# Patient Record
Sex: FEMALE | Race: WHITE | NOT HISPANIC OR LATINO | Employment: FULL TIME | URBAN - METROPOLITAN AREA
[De-identification: names, ages, dates, MRNs, and addresses within clinical notes are randomized per-mention and may not be internally consistent; named-entity substitution may affect disease eponyms.]

---

## 2020-03-13 LAB — HBA1C MFR BLD HPLC: 5.8 %

## 2020-04-02 ENCOUNTER — TELEMEDICINE (OUTPATIENT)
Dept: ENDOCRINOLOGY | Facility: HOSPITAL | Age: 35
End: 2020-04-02
Payer: COMMERCIAL

## 2020-04-02 DIAGNOSIS — E03.9 ACQUIRED HYPOTHYROIDISM: Primary | ICD-10-CM

## 2020-04-02 DIAGNOSIS — E55.9 VITAMIN D DEFICIENCY: ICD-10-CM

## 2020-04-02 PROCEDURE — 99214 OFFICE O/P EST MOD 30 MIN: CPT | Performed by: INTERNAL MEDICINE

## 2020-04-02 RX ORDER — LEVOTHYROXINE SODIUM 0.12 MG/1
125 TABLET ORAL DAILY
Qty: 30 TABLET | Refills: 6 | Status: SHIPPED | OUTPATIENT
Start: 2020-04-02 | End: 2020-10-14 | Stop reason: SDUPTHER

## 2020-04-02 RX ORDER — ERGOCALCIFEROL 1.25 MG/1
50000 CAPSULE ORAL WEEKLY
COMMUNITY
Start: 2020-03-16 | End: 2020-07-09 | Stop reason: SDUPTHER

## 2020-04-02 RX ORDER — LEVOTHYROXINE SODIUM 0.12 MG/1
125 TABLET ORAL DAILY
COMMUNITY
Start: 2020-03-13 | End: 2020-04-02 | Stop reason: SDUPTHER

## 2020-04-02 RX ORDER — NORGESTIMATE AND ETHINYL ESTRADIOL 7DAYSX3 28
1 KIT ORAL DAILY
COMMUNITY
Start: 2020-03-13 | End: 2020-07-09 | Stop reason: ALTCHOICE

## 2020-04-02 RX ORDER — LIOTHYRONINE SODIUM 5 UG/1
5 TABLET ORAL DAILY
Qty: 30 TABLET | Refills: 6 | Status: SHIPPED | OUTPATIENT
Start: 2020-04-02 | End: 2020-10-14 | Stop reason: SDUPTHER

## 2020-06-17 LAB
T3FREE SERPL-MCNC: 3.6 PG/ML (ref 2.3–4.2)
T4 FREE SERPL-MCNC: 1.7 NG/DL (ref 0.8–1.8)
TSH SERPL-ACNC: 0.37 MIU/L

## 2020-07-09 ENCOUNTER — OFFICE VISIT (OUTPATIENT)
Dept: ENDOCRINOLOGY | Facility: HOSPITAL | Age: 35
End: 2020-07-09
Payer: COMMERCIAL

## 2020-07-09 VITALS
DIASTOLIC BLOOD PRESSURE: 98 MMHG | BODY MASS INDEX: 35.83 KG/M2 | HEART RATE: 118 BPM | TEMPERATURE: 98.2 F | SYSTOLIC BLOOD PRESSURE: 142 MMHG | WEIGHT: 236.4 LBS | HEIGHT: 68 IN

## 2020-07-09 DIAGNOSIS — E55.9 VITAMIN D DEFICIENCY: ICD-10-CM

## 2020-07-09 DIAGNOSIS — E03.9 ACQUIRED HYPOTHYROIDISM: Primary | ICD-10-CM

## 2020-07-09 DIAGNOSIS — R73.09 ELEVATED HEMOGLOBIN A1C: ICD-10-CM

## 2020-07-09 DIAGNOSIS — Z83.3 FAMILY HISTORY OF DIABETES MELLITUS: ICD-10-CM

## 2020-07-09 PROBLEM — E78.1 HYPERTRIGLYCERIDEMIA: Status: ACTIVE | Noted: 2020-07-09

## 2020-07-09 PROCEDURE — 99213 OFFICE O/P EST LOW 20 MIN: CPT | Performed by: INTERNAL MEDICINE

## 2020-07-09 RX ORDER — ERGOCALCIFEROL 1.25 MG/1
50000 CAPSULE ORAL WEEKLY
Qty: 12 CAPSULE | Refills: 3 | Status: SHIPPED | OUTPATIENT
Start: 2020-07-09 | End: 2021-07-06 | Stop reason: SDUPTHER

## 2020-07-09 RX ORDER — ALBUTEROL SULFATE 90 UG/1
2 AEROSOL, METERED RESPIRATORY (INHALATION) EVERY 6 HOURS PRN
COMMUNITY

## 2020-07-09 NOTE — PATIENT INSTRUCTIONS
The thyroid blood work is good  Continue the same levothyroxine and cytomel  If pregnant, call and we'll change medicine  Follow up in 3 months with fasting blood work and we'll change medicine to levothyroxine only as cytomel is not approved in pregnancy

## 2020-07-09 NOTE — PROGRESS NOTES
7/9/2020    Assessment/Plan      Diagnoses and all orders for this visit:    Acquired hypothyroidism  -     TSH, 3rd generation Lab Collect; Future  -     T4, free Lab Collect; Future  -     T3, free; Future    Vitamin D deficiency  -     TSH, 3rd generation Lab Collect; Future  -     T4, free Lab Collect; Future  -     T3, free; Future  -     ergocalciferol (VITAMIN D2) 50,000 units; Take 1 capsule (50,000 Units total) by mouth once a week    Elevated hemoglobin A1c  -     HEMOGLOBIN A1C W/ EAG ESTIMATION Lab Collect; Future  -     Comprehensive metabolic panel Lab Collect; Future  -     Insulin, fasting- Lab Collect; Future    Family history of diabetes mellitus  -     HEMOGLOBIN A1C W/ EAG ESTIMATION Lab Collect; Future  -     Comprehensive metabolic panel Lab Collect; Future  -     Insulin, fasting- Lab Collect; Future    Other orders  -     Prenatal MV-Min-Fe Fum-FA-DHA (PRENATAL 1 PO); Take by mouth  -     albuterol (PROVENTIL HFA,VENTOLIN HFA) 90 mcg/act inhaler; Inhale 2 puffs every 6 (six) hours as needed for wheezing        Assessment/Plan:  1  Hypothyroidism  Her most recent thyroid function tests do show a TSH that is slightly below normal but still reasonable and she is feeling quite well with a normal free T4 and free T3  She is clinically euthyroid and feels much better  For now, she will continue the same levothyroxine 125 mcg and liothyronine 5 mcg daily  She was informed that she cannot be on T3 supplementation if she decides to get pregnant so I will need to switch her to all levothyroxine likely at 150 mcg daily if she decides she is going to try to get pregnant  She says right now she is going to wait till January so I will have her follow up in the fall and switch her to levothyroxine alone at that point  She was asked to call if she gets pregnant before her next visit and we will switch her to levothyroxine alone  2  Vitamin-D deficiency    She will continue the same vitamin-D 62779 units once a week  3  Elevated hemoglobin A1c with family history of diabetes  She had a recently elevated hemoglobin A1c of 5 8%  This is in the prediabetes range  I will repeat a hemoglobin A1c and fasting CMP with insulin level with her next visit  She will continue to work on carbohydrate restriction weight loss along with exercise in the meantime  I have asked her to follow up in 3 months with preceding hemoglobin A1c, fasting CMP and insulin level, TSH, free T4, and free T3     CC:  Hypothyroid and vitamin-D follow-up    History of Present Illness     HPI: Consuelo Rainey is a 37y o  year old female with history of hypothyroidism, antibody negative and vitamin-D deficiency for follow-up visit  She was diagnosed with hypothyroidism in 2006 to 2007 and started thyroid hormone  Last visit given her symptoms, restarted T3 supplementation and I have placed her on levothyroxine 125 mcg daily and Cytomel 5 mcg daily  She is feeling much better  Had transient lockjaw with first started T3  She says she feels much better with T3 supplementation  She still feels cold when she is in the are conditioning and sweating outside  She denies significant fatigue  She says her brain fog is almost gone  She has chronic dry skin still and brittle nails along with hair loss  She denies constipation or diarrhea, palpitation, tremors, anxiety or depression  She denies insomnia  Menstrual cycles have been occurring once a month but they are about a week late  She stopped her oral contraceptives about 3 months ago  She denies diplopia  She denies compressive thyroid symptoms or difficulties with swallowing  She has vitamin-D deficiency  She takes vitamin-D 89057 units once a week per her primary care physician  She denies perioral paresthesias  She reports that she recently had a hemoglobin A1c of 5 8%  She says she has a family history of type 2 diabetes    She has interesting and following her blood sugars  Struggling with son and his antics and he is almost 1years old  She is trying to get pregnant soon  Maybe in jan 2021  Review of Systems   Constitutional: Negative for fatigue and unexpected weight change  Fatigue improving  Was 260 lbs jan 2020 and weight down in the 230-238 lbs now  Had intermittent fasting unintentionally  HENT: Negative for trouble swallowing  Eyes: Negative for visual disturbance  No diplopia  Respiratory: Negative for chest tightness and shortness of breath  SOB with asthma  Cardiovascular: Negative for chest pain and palpitations  Gastrointestinal: Negative for abdominal pain, constipation, diarrhea and nausea  Can have diarrhea or constipation depending on what she eats  Endocrine: Positive for cold intolerance  Negative for heat intolerance  Tends to always be cold in the [facility] and sweating outside  Genitourinary:        Stopped OCP 3 months ago  Menses 1 week early every month  Started back with PMS symptoms  Skin: Negative for rash  Has chronic dry skin  Has brittle nails  Has hair loss  Neurological: Negative for dizziness, tremors, light-headedness and headaches  Psychiatric/Behavioral: Negative for dysphoric mood and sleep disturbance  The patient is not nervous/anxious  Brain fog almost gone         Historical Information   Past Medical History:   Diagnosis Date    Asthma     Depression     GERD (gastroesophageal reflux disease)     Tendonitis     right wrist and thumb and bilateral heels     Past Surgical History:   Procedure Laterality Date    WISDOM TOOTH EXTRACTION       Social History   Social History     Substance and Sexual Activity   Alcohol Use Yes    Frequency: Monthly or less     Social History     Substance and Sexual Activity   Drug Use Never     Social History     Tobacco Use   Smoking Status Never Smoker   Smokeless Tobacco Never Used     Family History:   Family History   Problem Relation Age of Onset    Hypothyroidism Mother     Hyperlipidemia Mother         high triglycerides    Carpal tunnel syndrome Mother     Hypertension Father     Arthritis Father     Hypothyroidism Paternal Grandmother     Diabetes unspecified Paternal Grandfather     Skin cancer Paternal Grandfather     Hypothyroidism Maternal Aunt         multiple    Hypothyroidism Family         multiple great aunts    No Known Problems Brother     No Known Problems Son     No Known Problems Brother        Meds/Allergies   Current Outpatient Medications   Medication Sig Dispense Refill    albuterol (PROVENTIL HFA,VENTOLIN HFA) 90 mcg/act inhaler Inhale 2 puffs every 6 (six) hours as needed for wheezing      ergocalciferol (VITAMIN D2) 50,000 units Take 1 capsule (50,000 Units total) by mouth once a week 12 capsule 3    levothyroxine 125 mcg tablet Take 1 tablet (125 mcg total) by mouth daily 30 tablet 6    liothyronine (CYTOMEL) 5 mcg tablet Take 1 tablet (5 mcg total) by mouth daily 30 tablet 6    Prenatal MV-Min-Fe Fum-FA-DHA (PRENATAL 1 PO) Take by mouth       No current facility-administered medications for this visit  Allergies   Allergen Reactions    Amoxicillin      Yeast infection       Objective   Vitals: Blood pressure 142/98, pulse (!) 118, temperature 98 2 °F (36 8 °C), height 5' 7 75" (1 721 m), weight 107 kg (236 lb 6 4 oz)  Invasive Devices     None                 Physical Exam   Constitutional: She is oriented to person, place, and time  She appears well-developed and well-nourished  HENT:   Head: Normocephalic and atraumatic  Eyes: Pupils are equal, round, and reactive to light  Conjunctivae and EOM are normal    No lid lag, stare, proptosis, or periorbital edema  Neck: Normal range of motion  Neck supple  No thyromegaly present  Thyroid normal in size  No carotid bruits  Cardiovascular: Normal rate, regular rhythm and normal heart sounds  No murmur heard    Pulmonary/Chest: Effort normal and breath sounds normal  She has no wheezes  Abdominal: Soft  Musculoskeletal: Normal range of motion  She exhibits no edema or deformity  No tremor of the outstretched hands  Lymphadenopathy:     She has no cervical adenopathy  Neurological: She is alert and oriented to person, place, and time  She has normal reflexes  Deep tendon reflexes normal    Skin: Skin is warm and dry  No rash noted  Vitals reviewed  The history was obtained from the review of the chart and from the patient  Lab Results:      Blood work done on 06/16/2020 demonstrated the following results:    Lab Results   Component Value Date    TSH 0 37 (L) 06/16/2020    FREET4 1 7 06/16/2020   Free T3 is 3 6      Future Appointments   Date Time Provider Deborah Melendez   10/20/2020  1:00 PM Ginger Turner MD [facility]

## 2020-10-14 DIAGNOSIS — E03.9 ACQUIRED HYPOTHYROIDISM: ICD-10-CM

## 2020-10-14 RX ORDER — LIOTHYRONINE SODIUM 5 UG/1
5 TABLET ORAL DAILY
Qty: 30 TABLET | Refills: 6 | Status: SHIPPED | OUTPATIENT
Start: 2020-10-14 | End: 2021-10-13

## 2020-10-14 RX ORDER — LEVOTHYROXINE SODIUM 0.12 MG/1
125 TABLET ORAL DAILY
Qty: 30 TABLET | Refills: 6 | Status: SHIPPED | OUTPATIENT
Start: 2020-10-14 | End: 2021-07-06 | Stop reason: SDUPTHER

## 2021-03-20 LAB
T3FREE SERPL-MCNC: 3 PG/ML (ref 2–4.4)
T4 FREE SERPL-MCNC: 0.92 NG/DL (ref 0.82–1.77)
TSH SERPL DL<=0.005 MIU/L-ACNC: 2.9 UIU/ML (ref 0.45–4.5)

## 2021-03-22 ENCOUNTER — TELEPHONE (OUTPATIENT)
Dept: ENDOCRINOLOGY | Facility: HOSPITAL | Age: 36
End: 2021-03-22

## 2021-03-22 NOTE — TELEPHONE ENCOUNTER
Patient l/m asking for her thyroid lab results  She said she is in the middle of switching insurances and can't come in yet  She is currently expecting and can't wait for the results

## 2021-05-19 DIAGNOSIS — E03.9 ACQUIRED HYPOTHYROIDISM: ICD-10-CM

## 2021-05-19 RX ORDER — LEVOTHYROXINE SODIUM 0.12 MG/1
TABLET ORAL
Qty: 30 TABLET | Refills: 6 | OUTPATIENT
Start: 2021-05-19

## 2021-06-28 ENCOUNTER — TELEPHONE (OUTPATIENT)
Dept: ENDOCRINOLOGY | Facility: HOSPITAL | Age: 36
End: 2021-06-28

## 2021-06-28 DIAGNOSIS — Z83.3 FAMILY HISTORY OF DIABETES MELLITUS: ICD-10-CM

## 2021-06-28 DIAGNOSIS — E55.9 VITAMIN D DEFICIENCY: ICD-10-CM

## 2021-06-28 DIAGNOSIS — R73.09 ELEVATED HEMOGLOBIN A1C: ICD-10-CM

## 2021-06-28 DIAGNOSIS — E03.9 ACQUIRED HYPOTHYROIDISM: Primary | ICD-10-CM

## 2021-06-28 NOTE — TELEPHONE ENCOUNTER
Patient called  She would like you to order lab work for her to have done before her appointment next week  She is going to the [employer] in [facility] tomorrow and would like these faxed over

## 2021-06-30 LAB
25(OH)D3 SERPL-MCNC: 64 NG/ML (ref 30–100)
ALBUMIN SERPL-MCNC: 3.1 G/DL (ref 3.6–5.1)
ALBUMIN/GLOB SERPL: 1.5 (CALC) (ref 1–2.5)
ALP SERPL-CCNC: 114 U/L (ref 31–125)
ALT SERPL-CCNC: 10 U/L (ref 6–29)
AST SERPL-CCNC: 12 U/L (ref 10–30)
BILIRUB SERPL-MCNC: 0.3 MG/DL (ref 0.2–1.2)
BUN SERPL-MCNC: 9 MG/DL (ref 7–25)
BUN/CREAT SERPL: ABNORMAL (CALC) (ref 6–22)
CALCIUM SERPL-MCNC: 9.2 MG/DL (ref 8.6–10.2)
CHLORIDE SERPL-SCNC: 106 MMOL/L (ref 98–110)
CO2 SERPL-SCNC: 22 MMOL/L (ref 20–32)
CREAT SERPL-MCNC: 0.82 MG/DL (ref 0.5–1.1)
EST. AVERAGE GLUCOSE BLD GHB EST-MCNC: 100 (CALC)
EST. AVERAGE GLUCOSE BLD GHB EST-SCNC: 5.5 (CALC)
GLOBULIN SER CALC-MCNC: 2.1 G/DL (CALC) (ref 1.9–3.7)
GLUCOSE SERPL-MCNC: 102 MG/DL (ref 65–139)
HBA1C MFR BLD: 5.1 % OF TOTAL HGB
POTASSIUM SERPL-SCNC: 4.5 MMOL/L (ref 3.5–5.3)
PROT SERPL-MCNC: 5.2 G/DL (ref 6.1–8.1)
SL AMB EGFR AFRICAN AMERICAN: 108 ML/MIN/1.73M2
SL AMB EGFR NON AFRICAN AMERICAN: 93 ML/MIN/1.73M2
SODIUM SERPL-SCNC: 135 MMOL/L (ref 135–146)
T3FREE SERPL-MCNC: 2.8 PG/ML (ref 2.3–4.2)
T4 FREE SERPL-MCNC: 1.1 NG/DL (ref 0.8–1.8)
TSH SERPL-ACNC: 2.55 MIU/L

## 2021-07-03 LAB — INSULIN SERPL-ACNC: 16.3 UIU/ML

## 2021-07-06 ENCOUNTER — TELEMEDICINE (OUTPATIENT)
Dept: ENDOCRINOLOGY | Facility: HOSPITAL | Age: 36
End: 2021-07-06
Payer: COMMERCIAL

## 2021-07-06 VITALS — BODY MASS INDEX: 42.12 KG/M2 | WEIGHT: 275 LBS

## 2021-07-06 DIAGNOSIS — E55.9 VITAMIN D DEFICIENCY: ICD-10-CM

## 2021-07-06 DIAGNOSIS — E03.9 ACQUIRED HYPOTHYROIDISM: Primary | ICD-10-CM

## 2021-07-06 PROCEDURE — 99214 OFFICE O/P EST MOD 30 MIN: CPT | Performed by: INTERNAL MEDICINE

## 2021-07-06 RX ORDER — ERGOCALCIFEROL 1.25 MG/1
50000 CAPSULE ORAL WEEKLY
Qty: 12 CAPSULE | Refills: 3 | Status: SHIPPED | OUTPATIENT
Start: 2021-07-06 | End: 2021-11-04 | Stop reason: SDUPTHER

## 2021-07-06 RX ORDER — LEVOTHYROXINE SODIUM 0.12 MG/1
125 TABLET ORAL DAILY
Qty: 30 TABLET | Refills: 6 | Status: SHIPPED | OUTPATIENT
Start: 2021-07-06 | End: 2021-10-13 | Stop reason: DRUGHIGH

## 2021-07-06 NOTE — PATIENT INSTRUCTIONS
The thyroid blood work is normal   Continue the same levothyroxine 125 mcg daily  The vitamin-D level is normal   Continue the same vitamin-D 80016 units once a week  Follow-up about 2-3 months post delivery which would be about 3 months from now with preceding thyroid blood work

## 2021-07-06 NOTE — PROGRESS NOTES
Virtual Regular Visit      Assessment/Plan:    Problem List Items Addressed This Visit        Endocrine    Acquired hypothyroidism - Primary    Relevant Medications    levothyroxine 125 mcg tablet    Other Relevant Orders    TSH, 3rd generation Lab Collect    T4, free Lab Collect       Other    Vitamin D deficiency    Relevant Medications    ergocalciferol (VITAMIN D2) 50,000 units          Assessment and plan:    1  Hypothyroidism  Most recent thyroid function tests are normal   She is biochemically and clinically euthyroid  She will continue the same levothyroxine 125 mcg daily  She will stay off liothyronine as that needed to be discontinued for pregnancy  She will be able to resume it once she is not pregnant again if she should show choose to do so  2  Thyroid disease in pregnancy  She is now in the 3rd trimester very close to delivery  At this point thyroid function tests are normal   She will continue to keep her TSH less than 2 5     3  Vitamin-D deficiency  Most recent vitamin-D level is normal   She will continue the same vitamin-D 36675 units once a week  I have asked her to follow up in 3 months with preceding TSH and free T4  Reason for visit is   Chief Complaint   Patient presents with    Virtual Regular Visit    and hypothyroid, vitamin-D deficiency follow-up    Encounter provider Chai Cavazos MD    Provider located at [address] [place] [address] [address] [place] [id number]      Recent Visits  No visits were found meeting these conditions  Showing recent visits within past 7 days and meeting all other requirements  Today's Visits  Date Type Provider Dept   07/06/21 Telemedicine Chai Cavazos MD [facility]   Showing today's visits and meeting all other requirements  Future Appointments  No visits were found meeting these conditions    Showing future appointments within next 150 days and meeting all other requirements       The patient was identified by name and date of birth  Yoselyn Annabel was informed that this is a telemedicine visit and that the visit is being conducted through [facility] and patient was informed that this is not a secure, HIPAA-compliant platform  She agrees to proceed     My office door was closed  No one else was in the room  She acknowledged consent and understanding of privacy and security of the video platform  The patient has agreed to participate and understands they can discontinue the visit at any time  Patient is aware this is a billable service  Harika Cooley is a 31 y o  female with a history of hypothyroidism and vitamin-D deficiency currently pregnant in the 3rd trimester for follow-up visit via video telemedicine   She was diagnosed with hypothyroidism around 2321-0908 and started on thyroid hormone  She was tried on T3 supplementation in addition to T4 supplementation in April 2020 due to continued hypothyroid symptoms  She did feel better on that but did become pregnant and T3 supplementation was discontinued for pregnancy  She is currently only taking levothyroxine 125 mcg daily  She unfortunately did not follow-up as appropriately requested during pregnancy  She is now 38 weeks gestation with an EDC of 07/20/2021 of a baby boy a  She is hoping to pump to nurse her baby boy  She generally feels good and says baby is kicking  She feels better during this pregnancy than the last 1  She will be a little cold when the air conditioning is on  She has had random palpitations that typically occur in pregnancy  She has some fatigue on some days and will nap on the couch but other days has energy  She does wake every few hours to urinate  She has dry skin but no brittle nails or hair loss  She denies diarrhea or constipation, anxiety or depression, or tremors  She denies heat intolerance    She denies diplopia  She denies compressive thyroid symptoms or difficulties with swallowing  Weight is relatively normal for pregnancy  She has vitamin-D deficiency and takes vitamin-D 86576 units once a week  She has no perioral paresthesias  HPI     Past Medical History:   Diagnosis Date    Asthma     Depression     GERD (gastroesophageal reflux disease)     Tendonitis     right wrist and thumb and bilateral heels       Past Surgical History:   Procedure Laterality Date    WISDOM TOOTH EXTRACTION         Current Outpatient Medications   Medication Sig Dispense Refill    albuterol (PROVENTIL HFA,VENTOLIN HFA) 90 mcg/act inhaler Inhale 2 puffs every 6 (six) hours as needed for wheezing      ergocalciferol (VITAMIN D2) 50,000 units Take 1 capsule (50,000 Units total) by mouth once a week 12 capsule 3    levothyroxine 125 mcg tablet Take 1 tablet (125 mcg total) by mouth daily 30 tablet 6    Prenatal MV-Min-Fe Fum-FA-DHA (PRENATAL 1 PO) Take by mouth      liothyronine (CYTOMEL) 5 mcg tablet Take 1 tablet (5 mcg total) by mouth daily (Patient not taking: Reported on 7/6/2021) 30 tablet 6     No current facility-administered medications for this visit  Allergies   Allergen Reactions    Amoxicillin      Yeast infection       Review of Systems   Constitutional: Positive for fatigue  Negative for unexpected weight change  Some fatigue on certain days and will nap on the couch  HENT: Negative for trouble swallowing  Eyes: Positive for visual disturbance  No diplopia  Vision affected by Terrilee Pinna in her visual fields at times  Respiratory: Positive for shortness of breath  Negative for chest tightness  Some shortness of breath when walking or with increased exertion  Cardiovascular: Positive for palpitations  Negative for chest pain  Will get random palpitations in pregnancy  Gastrointestinal: Negative for abdominal pain, constipation, diarrhea and nausea  Endocrine: Negative for cold intolerance and heat intolerance  Can tends to be cold in the air conditioning  Skin: Negative for rash  Has dry skin without change  No brittle nails  No hair loss  Neurological: Positive for headaches  Negative for dizziness, tremors, light-headedness and numbness  A rare headache  Psychiatric/Behavioral: Positive for sleep disturbance  Negative for dysphoric mood  The patient is not nervous/anxious  Wakes every 1-2 hours to urinate  Video Exam    Vitals:    07/06/21 1152   Weight: 125 kg (275 lb)       Physical Exam     Physical Exam   Constitutional: She is oriented to person, place, and time  She appears well-developed and well-nourished  No distress  HENT:  No lid lag, stare, proptosis, or periorbital edema  Head: Normocephalic and atraumatic  Neck: Normal range of motion  No obvious thyroid enlargement  Pulmonary/Chest: Effort normal   No audible wheezing  Musculoskeletal: Normal range of motion  Neurological: She is alert and oriented to person, place, and time  Skin: She is not diaphoretic  Psychiatric: She has a normal mood and affect  Her behavior is normal      Blood work:    Blood work done on 06/29/2021 showed hemoglobin A1c of 5 1%  This is significantly improved from 5 8% in March 2020  CMP showed a glucose of 102 but was otherwise normal     TSH is 2 55 with a free T4 1 1  Free T3 is 2 8  Twenty-five hydroxy vitamin-D level is 64  I spent 15 minutes directly with the patient during this visit      [address] Donnie acknowledges that she has consented to an online visit or consultation  She understands that the online visit is based solely on information provided by her, and that, in the absence of a face-to-face physical evaluation by the physician, the diagnosis she receives is both limited and provisional in terms of accuracy and completeness   This is not intended to replace a full medical face-to-face evaluation by the physician  Sarina Fierro understands and accepts these terms

## 2021-10-01 LAB
T4 FREE SERPL-MCNC: 1.3 NG/DL (ref 0.8–1.8)
TSH SERPL-ACNC: 0.85 MIU/L

## 2021-10-13 ENCOUNTER — OFFICE VISIT (OUTPATIENT)
Dept: ENDOCRINOLOGY | Facility: HOSPITAL | Age: 36
End: 2021-10-13
Payer: COMMERCIAL

## 2021-10-13 VITALS
HEART RATE: 118 BPM | SYSTOLIC BLOOD PRESSURE: 146 MMHG | BODY MASS INDEX: 38.58 KG/M2 | WEIGHT: 254.6 LBS | HEIGHT: 68 IN | DIASTOLIC BLOOD PRESSURE: 86 MMHG

## 2021-10-13 DIAGNOSIS — E55.9 VITAMIN D DEFICIENCY: ICD-10-CM

## 2021-10-13 DIAGNOSIS — E03.9 ACQUIRED HYPOTHYROIDISM: Primary | ICD-10-CM

## 2021-10-13 PROCEDURE — 99214 OFFICE O/P EST MOD 30 MIN: CPT | Performed by: INTERNAL MEDICINE

## 2021-10-13 RX ORDER — LEVOTHYROXINE SODIUM 112 UG/1
112 TABLET ORAL DAILY
Qty: 30 TABLET | Refills: 6 | Status: SHIPPED | OUTPATIENT
Start: 2021-10-13 | End: 2022-04-22 | Stop reason: SDUPTHER

## 2021-10-13 RX ORDER — LIOTHYRONINE SODIUM 5 UG/1
5 TABLET ORAL DAILY
Qty: 30 TABLET | Refills: 6 | Status: SHIPPED | OUTPATIENT
Start: 2021-10-13 | End: 2022-04-22 | Stop reason: SDUPTHER

## 2021-11-04 DIAGNOSIS — E55.9 VITAMIN D DEFICIENCY: Primary | ICD-10-CM

## 2021-11-04 RX ORDER — ERGOCALCIFEROL 1.25 MG/1
50000 CAPSULE ORAL WEEKLY
Qty: 12 CAPSULE | Refills: 3 | Status: SHIPPED | OUTPATIENT
Start: 2021-11-04 | End: 2022-04-22 | Stop reason: SDUPTHER

## 2022-01-14 LAB
25(OH)D3 SERPL-MCNC: 74 NG/ML (ref 30–100)
ALBUMIN SERPL-MCNC: 4.2 G/DL (ref 3.6–5.1)
ALBUMIN/GLOB SERPL: 1.8 (CALC) (ref 1–2.5)
ALP SERPL-CCNC: 69 U/L (ref 31–125)
ALT SERPL-CCNC: 23 U/L (ref 6–29)
AST SERPL-CCNC: 16 U/L (ref 10–30)
BILIRUB SERPL-MCNC: 0.4 MG/DL (ref 0.2–1.2)
BUN SERPL-MCNC: 19 MG/DL (ref 7–25)
BUN/CREAT SERPL: NORMAL (CALC) (ref 6–22)
CALCIUM SERPL-MCNC: 9.4 MG/DL (ref 8.6–10.2)
CHLORIDE SERPL-SCNC: 104 MMOL/L (ref 98–110)
CO2 SERPL-SCNC: 24 MMOL/L (ref 20–32)
CREAT SERPL-MCNC: 1.01 MG/DL (ref 0.5–1.1)
GLOBULIN SER CALC-MCNC: 2.3 G/DL (CALC) (ref 1.9–3.7)
GLUCOSE SERPL-MCNC: 82 MG/DL (ref 65–99)
PHOSPHATE SERPL-MCNC: 3.6 MG/DL (ref 2.5–4.5)
POTASSIUM SERPL-SCNC: 4.5 MMOL/L (ref 3.5–5.3)
PROT SERPL-MCNC: 6.5 G/DL (ref 6.1–8.1)
SL AMB EGFR AFRICAN AMERICAN: 84 ML/MIN/1.73M2
SL AMB EGFR NON AFRICAN AMERICAN: 73 ML/MIN/1.73M2
SODIUM SERPL-SCNC: 136 MMOL/L (ref 135–146)
T3FREE SERPL-MCNC: 2.9 PG/ML (ref 2.3–4.2)
T4 FREE SERPL-MCNC: 1.2 NG/DL (ref 0.8–1.8)
TSH SERPL-ACNC: 1.36 MIU/L

## 2022-01-19 ENCOUNTER — OFFICE VISIT (OUTPATIENT)
Dept: ENDOCRINOLOGY | Facility: HOSPITAL | Age: 37
End: 2022-01-19
Payer: COMMERCIAL

## 2022-01-19 VITALS
HEART RATE: 108 BPM | WEIGHT: 257.8 LBS | DIASTOLIC BLOOD PRESSURE: 70 MMHG | SYSTOLIC BLOOD PRESSURE: 128 MMHG | BODY MASS INDEX: 39.07 KG/M2 | HEIGHT: 68 IN

## 2022-01-19 DIAGNOSIS — E03.9 ACQUIRED HYPOTHYROIDISM: Primary | ICD-10-CM

## 2022-01-19 DIAGNOSIS — E55.9 VITAMIN D DEFICIENCY: ICD-10-CM

## 2022-01-19 PROCEDURE — 99213 OFFICE O/P EST LOW 20 MIN: CPT | Performed by: INTERNAL MEDICINE

## 2022-01-19 RX ORDER — NORGESTIMATE AND ETHINYL ESTRADIOL 7DAYSX3 LO
1 KIT ORAL DAILY
COMMUNITY

## 2022-01-19 NOTE — PROGRESS NOTES
1/19/2022    Assessment/Plan      Diagnoses and all orders for this visit:    Acquired hypothyroidism  -     TSH, 3rd generation Lab Collect; Future  -     T4, free Lab Collect; Future  -     T3, free; Future    Vitamin D deficiency  -     TSH, 3rd generation Lab Collect; Future  -     T4, free Lab Collect; Future  -     T3, free; Future    Other orders  -     norgestimate-ethinyl estradiol (Ortho Tri-Cyclen Lo) 0 18/0 215/0 25 MG-25 MCG per tablet; Take 1 tablet by mouth daily        Assessment/Plan:  1  Hypothyroidism  Most recent thyroid function tests are normal   She is clinically and biochemically euthyroid  She will continue same levothyroxine 112 mcg a and liothyronine 5 mcg daily  2  Vitamin-D deficiency  Twenty-five hydroxy vitamin-D level is normal   She will continue same vitamin-D 28071 units once a week  I have asked her to follow up in 3 months with preceding TSH, free T4, and free T3         CC:  Hypothyroid and vitamin-D follow-up    History of Present Illness     HPI: Bethel Escobar is a 31y o  year old female with history of hypothyroidism and vitamin-D deficiency for follow-up visit  She was diagnosed with hypothyroidism around 2006 to 2007 and started thyroid hormone  She did try T3 supplementation in April 2020 when she had continued hypothyroid symptoms  She did feel better on this combination but the T3 supplementation has been discontinued when she was preparing to become pregnant  She is now postpartum at over 5 months  She has completed nursing  In October 2021, she switched back from T4 supplementation to combination T3 and T4 supplementation and is currently taking levothyroxine 112 mcg daily and liothyronine 5 mcg daily  Weight is 3 lb more than last visit  She is fatigued  Sleep tends to be interrupted by kids and is using Unisom at night  Anxiety is a bit worse when her  is driving  She tends to be hot all the time with heat intolerance    She has some looser stools in general   She denies cold intolerance, diarrhea or constipation, tremors, or depression  She has chronic dry skin which is worse in the winter but no brittle nails  Her hair loss which occurred within the last few months has been lessening  She denies diplopia  Menstrual cycles occur on a regular monthly basis but are bit heavier than in the past   She denies compressive thyroid symptoms or difficulties with swallowing  She also has vitamin-D deficiency and takes vitamin-D 93079 units once a week  Review of Systems   Constitutional: Positive for fatigue  Negative for unexpected weight change  Weight 3 lbs more than last visit  HENT: Negative for trouble swallowing  Eyes: Negative for visual disturbance  No diplopia  Respiratory: Negative for chest tightness and shortness of breath  Cardiovascular: Negative for chest pain and palpitations  Gastrointestinal: Negative for abdominal pain, constipation, diarrhea and nausea  Looser stools in general    Endocrine: Positive for heat intolerance  Negative for cold intolerance  Tends to be hot  Genitourinary:        Menses a lot heavier than in the past, occurs regular on a monthly basis  Skin: Negative for rash  Has chronic dry skin, worse in the winter  No brittle nails  Hair loss lessening  Had started losing hair after last visit but has settled down  Neurological: Negative for dizziness, tremors, light-headedness and headaches  Occasional headaches  Psychiatric/Behavioral: Positive for sleep disturbance  Negative for dysphoric mood  The patient is nervous/anxious  Works 4 pm to 7 pm part time  Sleep interrupted buy kids, using Unisom at night  Anxiety a bit worse, usually when  is driving         Historical Information   Past Medical History:   Diagnosis Date    Asthma     Depression     GERD (gastroesophageal reflux disease)     Tendonitis     right wrist and thumb and bilateral heels     Past Surgical History:   Procedure Laterality Date     SECTION  2021    WISDOM TOOTH EXTRACTION       Social History   Social History     Substance and Sexual Activity   Alcohol Use Yes     Social History     Substance and Sexual Activity   Drug Use Never     Social History     Tobacco Use   Smoking Status Never Smoker   Smokeless Tobacco Never Used     Family History:   Family History   Problem Relation Age of Onset    Hypothyroidism Mother     Hyperlipidemia Mother         high triglycerides    Carpal tunnel syndrome Mother     Hypertension Father     Arthritis Father     Hypothyroidism Paternal Grandmother     Diabetes unspecified Paternal Grandfather     Skin cancer Paternal Grandfather     Hypothyroidism Maternal Aunt         multiple    Hypothyroidism Family         multiple great aunts    No Known Problems Brother     No Known Problems Son     No Known Problems Brother     No Known Problems Son        Meds/Allergies   Current Outpatient Medications   Medication Sig Dispense Refill    albuterol (PROVENTIL HFA,VENTOLIN HFA) 90 mcg/act inhaler Inhale 2 puffs every 6 (six) hours as needed for wheezing      ergocalciferol (VITAMIN D2) 50,000 units Take 1 capsule (50,000 Units total) by mouth once a week 12 capsule 3    levothyroxine (Synthroid) 112 mcg tablet Take 1 tablet (112 mcg total) by mouth daily 30 tablet 6    liothyronine (CYTOMEL) 5 mcg tablet Take 1 tablet (5 mcg total) by mouth daily 30 tablet 6    norgestimate-ethinyl estradiol (Ortho Tri-Cyclen Lo) 0 18/0 215/0 25 MG-25 MCG per tablet Take 1 tablet by mouth daily       No current facility-administered medications for this visit  Allergies   Allergen Reactions    Amoxicillin      Yeast infection       Objective   Vitals: Blood pressure 128/70, pulse (!) 108, height 5' 7 75" (1 721 m), weight 117 kg (257 lb 12 8 oz)    Invasive Devices  Report    None                 Physical Exam  Vitals reviewed  Constitutional:       Appearance: Normal appearance  She is well-developed  HENT:      Head: Normocephalic and atraumatic  Eyes:      Extraocular Movements: Extraocular movements intact  Conjunctiva/sclera: Conjunctivae normal       Comments: No lid lag, stare, proptosis, or periorbital edema  Neck:      Thyroid: No thyromegaly  Vascular: No carotid bruit  Comments: Thyroid normal in size no bruits over the thyroid gland  No palpable thyroid nodules  Cardiovascular:      Rate and Rhythm: Normal rate and regular rhythm  Heart sounds: Normal heart sounds  No murmur heard  Pulmonary:      Effort: Pulmonary effort is normal       Breath sounds: Normal breath sounds  No wheezing  Abdominal:      Palpations: Abdomen is soft  Musculoskeletal:         General: No deformity  Normal range of motion  Cervical back: Normal range of motion and neck supple  Comments: No tremor of the outstretched hands  Lymphadenopathy:      Cervical: No cervical adenopathy  Skin:     General: Skin is warm and dry  Findings: No rash  Neurological:      Mental Status: She is alert and oriented to person, place, and time  Deep Tendon Reflexes: Reflexes are normal and symmetric  Comments: Deep tendon reflexes normal          The history was obtained from the review of the chart and from the patient  Lab Results:    Blood work done on 01/13/2022 showed a CMP with a glucose of 82, calcium 9 4 with an albumin of 4 2 but was otherwise normal   Phosphorus is 3 6  Twenty-five hydroxy vitamin-D level is 74      Lab Results   Component Value Date    CREATININE 1 01 01/13/2022    CREATININE 0 82 06/29/2021    BUN 19 01/13/2022    K 4 5 01/13/2022     01/13/2022    CO2 24 01/13/2022         Lab Results   Component Value Date    ALT 23 01/13/2022    AST 16 01/13/2022    ALKPHOS 69 01/13/2022       Lab Results   Component Value Date    TSH 1 36 01/13/2022 FREET4 1 2 01/13/2022     Free T3 is 2 9          Future Appointments   Date Time Provider Deborah Melendez   4/22/2022 11:40 AM Angelita Valdez MD [facility]

## 2022-01-19 NOTE — PATIENT INSTRUCTIONS
The blood work is all normal    Continue same levothyroxine and liothyronine and vitamin D  Follow up in 3 months with blood work

## 2022-04-22 ENCOUNTER — TELEMEDICINE (OUTPATIENT)
Dept: ENDOCRINOLOGY | Facility: HOSPITAL | Age: 37
End: 2022-04-22
Payer: COMMERCIAL

## 2022-04-22 DIAGNOSIS — E55.9 VITAMIN D DEFICIENCY: ICD-10-CM

## 2022-04-22 DIAGNOSIS — E03.9 ACQUIRED HYPOTHYROIDISM: Primary | ICD-10-CM

## 2022-04-22 PROCEDURE — 99214 OFFICE O/P EST MOD 30 MIN: CPT | Performed by: INTERNAL MEDICINE

## 2022-04-22 RX ORDER — LEVOTHYROXINE SODIUM 112 UG/1
112 TABLET ORAL DAILY
Qty: 30 TABLET | Refills: 6 | Status: SHIPPED | OUTPATIENT
Start: 2022-04-22

## 2022-04-22 RX ORDER — ERGOCALCIFEROL 1.25 MG/1
50000 CAPSULE ORAL WEEKLY
Qty: 12 CAPSULE | Refills: 3 | Status: SHIPPED | OUTPATIENT
Start: 2022-04-22

## 2022-04-22 RX ORDER — LIOTHYRONINE SODIUM 5 UG/1
5 TABLET ORAL DAILY
Qty: 30 TABLET | Refills: 6 | Status: SHIPPED | OUTPATIENT
Start: 2022-04-22 | End: 2022-05-23

## 2022-04-22 NOTE — PROGRESS NOTES
Virtual Regular Visit    Verification of patient location:    Patient is located in the following state in which I hold an active license [place]      Assessment/Plan:    Problem List Items Addressed This Visit        Endocrine    Acquired hypothyroidism - Primary    Relevant Medications    levothyroxine (Synthroid) 112 mcg tablet    liothyronine (CYTOMEL) 5 mcg tablet    Other Relevant Orders    Comprehensive metabolic panel Lab Collect    Phosphorus Lab Collect    T4, free Lab Collect    TSH, 3rd generation Lab Collect    T3, free       Other    Vitamin D deficiency    Relevant Medications    ergocalciferol (VITAMIN D2) 50,000 units    Other Relevant Orders    Comprehensive metabolic panel Lab Collect    Phosphorus Lab Collect    T4, free Lab Collect    TSH, 3rd generation Lab Collect    T3, free          Assessment and plan:    1  Hypothyroidism  Most recent thyroid function studies are normal   Her TSH is in the higher end of normal and she could increase the dosage slightly if she chooses but she has chosen to stay on the same dose of levothyroxine 112 mcg daily and Cytomel 5 mcg daily  We will follow her levels and her clinical status over time  2  Vitamin-D deficiency  Her most recent vitamin-D level is normal   She will continue the same vitamin-D 5000 units once a week  Changes in medications and treatment will be determined based on future blood work if indicated  I have asked her to follow up in 6 months with preceding CMP, phosphorus, TSH, free T4, and free T3  Reason for visit is   Chief Complaint   Patient presents with    Virtual Regular Visit    and hypothyroid, vitamin-D deficiency follow-up    Encounter provider Augusta Sullivan MD    Provider located at [address] [place] [address] [address] [place] [id number]      Recent Visits  No visits were found meeting these conditions    Showing recent visits within past 7 days and meeting all other requirements  Today's Visits  Date Type Provider Dept   04/22/22 Telemedicine Mila Peguero MD [facility]   Showing today's visits and meeting all other requirements  Future Appointments  No visits were found meeting these conditions  Showing future appointments within next 150 days and meeting all other requirements       The patient was identified by name and date of birth  Devika Lwason was informed that this is a telemedicine visit and that the visit is being conducted through [address] and patient was informed this is a secure, HIPAA-complaint platform  She agrees to proceed     My office door was closed  No one else was in the room  She acknowledged consent and understanding of privacy and security of the video platform  The patient has agreed to participate and understands they can discontinue the visit at any time  Patient is aware this is a billable service  Subjective  Devika Lawson is a 30 y o  female with a history of hypothyroidism and vitamin-D deficiency for follow-up visit via video telemedicine   She was diagnosed with hypothyroidism around 2006 to 2007 and started thyroid hormone at that time  She tried T3 supplementation in April 2022 but continued to have hypothyroid symptoms  Eventually, she did feel better on this combination of T3 and T4  She then became pregnant and had to switch from T4 and T3 combination to just strictly T4  She was switch back to a combination therapy in October 2021 when she was 5 months postpartum  She is currently taking levothyroxine 112 mcg daily and liothyronine 5 mcg daily  She is now 8 half months postpartum  She is always somewhat hot but not sweaty  She is less fatigued but still has some fatigue and the baby only wakes up once a night so she sleeps fairly well  She has some anxiety and depression that comes and goes    Her anxiety is mostly situational  She will feel hand tremors when she feels her blood sugar is dropping  She has no palpitation or diarrhea  She is constipated at times  She has dry skin but no brittle nails since she started her collagen supplement and her hair loss has resolved since she cut her hair in 2021  She denies cold intolerance  Her weight is down about 6 lb  She denies diplopia  Menstrual cycles are occurring on a regular monthly basis every 28 days and lasting 3-4 days  She has vitamin-D deficiency and takes vitamin-D 86302 units once a week  HPI     Past Medical History:   Diagnosis Date    Asthma     Depression     GERD (gastroesophageal reflux disease)     Tendonitis     right wrist and thumb and bilateral heels       Past Surgical History:   Procedure Laterality Date     SECTION  2021    WISDOM TOOTH EXTRACTION         Current Outpatient Medications   Medication Sig Dispense Refill    albuterol (PROVENTIL HFA,VENTOLIN HFA) 90 mcg/act inhaler Inhale 2 puffs every 6 (six) hours as needed for wheezing      COLLAGEN PO Take by mouth in the morning      ergocalciferol (VITAMIN D2) 50,000 units Take 1 capsule (50,000 Units total) by mouth once a week 12 capsule 3    levothyroxine (Synthroid) 112 mcg tablet Take 1 tablet (112 mcg total) by mouth daily 30 tablet 6    liothyronine (CYTOMEL) 5 mcg tablet Take 1 tablet (5 mcg total) by mouth daily 30 tablet 6    norgestimate-ethinyl estradiol (Ortho Tri-Cyclen Lo) 0 18/0 215/0 25 MG-25 MCG per tablet Take 1 tablet by mouth daily       No current facility-administered medications for this visit  Allergies   Allergen Reactions    Amoxicillin      Yeast infection       Review of Systems   Constitutional: Positive for fatigue  Negative for unexpected weight change  Some less fatigue  Reports weight is 251 lb  HENT: Negative for trouble swallowing  Eyes: Negative for visual disturbance  No diplopia     Respiratory: Negative for chest tightness and shortness of breath  Cardiovascular: Negative for chest pain and palpitations  Gastrointestinal: Positive for constipation  Negative for abdominal pain, diarrhea and nausea  Occasional constipation at times since her umbilical hernia that was noted after her last delivery  Endocrine: Positive for heat intolerance  Negative for cold intolerance  Always hot but not sweaty  Genitourinary:        Menstrual cycles occur regular on a monthly basis, every 28 days lasting 3-4 days  Skin: Negative for rash  Has dry skin  Hair loss gone since she cut her in December 2021  No brittle nails on her collagen supplement  Neurological: Positive for tremors, light-headedness and headaches  Negative for dizziness  Some tremors of the hands when blood sugars seem to drop which will go way with eating  Has significant headaches but this is less than prior to pregnancy and then will be lightheaded afterwards  Psychiatric/Behavioral: Positive for dysphoric mood  Negative for sleep disturbance  The patient is nervous/anxious  Situational anxiety  Some depression that comes and goes  Baby only wakes her up once a night  Video Exam    There were no vitals filed for this visit  Physical Exam     Physical Exam   Constitutional:  She is oriented to person, place, and time  She appears well-developed and well-nourished  No distress  HENT:  No lid lag, stare, proptosis, or periorbital edema  Head: Normocephalic and atraumatic  Neck: Normal range of motion  No obvious neck enlargement  Pulmonary/Chest: Effort normal   No audible wheezing  Musculoskeletal: Normal range of motion  Neurological:  She is alert and oriented to person, place, and time  Skin:  She is not diaphoretic  Psychiatric:  She has a normal mood and affect    Her behavior is normal      Blood work:    Blood work done at [facility] on 04/14/2022 showed a TSH of 3 69 with a total T4 of 13 7 and a T3 uptake of 19  FTI was 2 6  Twenty-five hydroxy vitamin-D level is 75  BMP showed a glucose of 82, calcium of 9 6  Hemoglobin A1c is 5 8%  Total cholesterol 145, triglycerides 281, HDL 45, LDL 63  CBC showed no anemia  I spent 15 minutes directly with the patient during this visit    VIRTUAL VISIT Dean 57 verbally agrees to participate in [employer]  Pt is aware that [employer] could be limited without vital signs or the ability to perform a full hands-on physical Belgica Moralez understands she or the provider may request at any time to terminate the video visit and request the patient to seek care or treatment in person

## 2022-05-20 DIAGNOSIS — E03.9 ACQUIRED HYPOTHYROIDISM: ICD-10-CM

## 2022-05-23 RX ORDER — LIOTHYRONINE SODIUM 5 UG/1
TABLET ORAL
Qty: 30 TABLET | Refills: 6 | Status: SHIPPED | OUTPATIENT
Start: 2022-05-23

## 2022-10-21 LAB
ALBUMIN SERPL-MCNC: 3.9 G/DL (ref 3.6–5.1)
ALBUMIN/GLOB SERPL: 1.8 (CALC) (ref 1–2.5)
ALP SERPL-CCNC: 65 U/L (ref 31–125)
ALT SERPL-CCNC: 15 U/L (ref 6–29)
AST SERPL-CCNC: 10 U/L (ref 10–30)
BILIRUB SERPL-MCNC: 0.4 MG/DL (ref 0.2–1.2)
BUN SERPL-MCNC: 18 MG/DL (ref 7–25)
BUN/CREAT SERPL: NORMAL (CALC) (ref 6–22)
CALCIUM SERPL-MCNC: 9.2 MG/DL (ref 8.6–10.2)
CHLORIDE SERPL-SCNC: 108 MMOL/L (ref 98–110)
CO2 SERPL-SCNC: 24 MMOL/L (ref 20–32)
CREAT SERPL-MCNC: 0.97 MG/DL (ref 0.5–0.97)
GFR/BSA.PRED SERPLBLD CYS-BASED-ARV: 78 ML/MIN/1.73M2
GLOBULIN SER CALC-MCNC: 2.2 G/DL (CALC) (ref 1.9–3.7)
GLUCOSE SERPL-MCNC: 93 MG/DL (ref 65–99)
PHOSPHATE SERPL-MCNC: 2.6 MG/DL (ref 2.5–4.5)
POTASSIUM SERPL-SCNC: 4.2 MMOL/L (ref 3.5–5.3)
PROT SERPL-MCNC: 6.1 G/DL (ref 6.1–8.1)
SODIUM SERPL-SCNC: 139 MMOL/L (ref 135–146)
T3FREE SERPL-MCNC: 4.2 PG/ML (ref 2.3–4.2)
T4 FREE SERPL-MCNC: 1.4 NG/DL (ref 0.8–1.8)
TSH SERPL-ACNC: 1.88 MIU/L

## 2022-10-24 ENCOUNTER — OFFICE VISIT (OUTPATIENT)
Dept: ENDOCRINOLOGY | Facility: HOSPITAL | Age: 37
End: 2022-10-24
Payer: COMMERCIAL

## 2022-10-24 VITALS
HEART RATE: 116 BPM | OXYGEN SATURATION: 97 % | BODY MASS INDEX: 35.01 KG/M2 | SYSTOLIC BLOOD PRESSURE: 112 MMHG | DIASTOLIC BLOOD PRESSURE: 78 MMHG | HEIGHT: 68 IN | WEIGHT: 231 LBS

## 2022-10-24 DIAGNOSIS — L65.9 HAIR LOSS: ICD-10-CM

## 2022-10-24 DIAGNOSIS — E03.9 ACQUIRED HYPOTHYROIDISM: Primary | ICD-10-CM

## 2022-10-24 DIAGNOSIS — E55.9 VITAMIN D DEFICIENCY: ICD-10-CM

## 2022-10-24 PROCEDURE — 99214 OFFICE O/P EST MOD 30 MIN: CPT | Performed by: INTERNAL MEDICINE

## 2022-10-24 RX ORDER — LEVOTHYROXINE SODIUM 112 UG/1
112 TABLET ORAL DAILY
Qty: 30 TABLET | Refills: 6 | Status: SHIPPED | OUTPATIENT
Start: 2022-10-24

## 2022-10-24 RX ORDER — LIOTHYRONINE SODIUM 5 UG/1
5 TABLET ORAL DAILY
Qty: 30 TABLET | Refills: 6 | Status: SHIPPED | OUTPATIENT
Start: 2022-10-24

## 2022-10-24 RX ORDER — CETIRIZINE HYDROCHLORIDE 10 MG/1
10 TABLET ORAL DAILY
COMMUNITY

## 2022-10-24 RX ORDER — NICOTINE POLACRILEX 2 MG
GUM BUCCAL
COMMUNITY

## 2022-10-24 NOTE — PATIENT INSTRUCTIONS
The thyroid is normal     Continue the same synthroid and liothyronine dosages daily  Continue the same vitamin D once a week  Let's check the iron level and blood count  Follow up in 6 months with blood work

## 2022-10-24 NOTE — PROGRESS NOTES
10/25/2022    Assessment/Plan      Diagnoses and all orders for this visit:    Acquired hypothyroidism  -     Comprehensive metabolic panel Lab Collect; Future  -     TSH, 3rd generation Lab Collect; Future  -     T4, free Lab Collect; Future  -     Vitamin D 25 hydroxy Lab Collect; Future  -     T3, free; Future  -     levothyroxine (Synthroid) 112 mcg tablet; Take 1 tablet (112 mcg total) by mouth daily  -     liothyronine (CYTOMEL) 5 mcg tablet; Take 1 tablet (5 mcg total) by mouth daily    Vitamin D deficiency  -     Comprehensive metabolic panel Lab Collect; Future  -     TSH, 3rd generation Lab Collect; Future  -     T4, free Lab Collect; Future  -     Vitamin D 25 hydroxy Lab Collect; Future  -     T3, free; Future    Hair loss  -     Ferritin Lab Collect  -     CBC and differential Lab Collect    Other orders  -     Biotin 1 MG CAPS; Take by mouth  -     Phenylephrine HCl (AFRIN ALLERGY NA); into each nostril  -     cetirizine (ZyrTEC) 10 mg tablet; Take 10 mg by mouth daily        Assessment/Plan:  1  Hypothyroidism  Most recent thyroid function tests are normal   She is clinically and biochemically euthyroid  She will continue the same levothyroxine 112 mcg daily and liothyronine 5 mcg daily  2  Vitamin-D deficiency  Twenty-five hydroxy vitamin-D level is normal and she will continue the same vitamin-D once a week  3  Hair loss  I will further evaluate possible causes of hair loss by doing an iron level and CBC to check for iron-deficiency  I have asked her to follow up in 6 months with preceding CMP, TSH, free T4, free T3, and 25 hydroxy vitamin-D level  She will get a CBC and ferritin level done now  CC:  Hypothyroid, vitamin-D deficiency follow-up    History of Present Illness     HPI: Sadiq Dc is a 30y o  year old female with history of hypothyroidism and vitamin-D deficiency for follow-up visit        She was diagnosed with hypothyroidism around 2006 to 2007 and started thyroid hormone replacement  She tried T3 supplementation in April 2020 but continue with hypothyroid symptoms  Eventually she did feel better with the combination of T3 and T4 but became pregnant and had to switch to just strictly T4 supplementation  She switched back to combination therapy in October 2021 when she was 5 months postpartum  She is currently taking levothyroxine 112 mcg daily and liothyronine 5 mcg daily  She is over 1 year postpartum at this time  She is always somewhat hot and sweaty  She denies palpitation, tremors, cold intolerance, or unexplained weight changes  She has lost 26 lb since January 2022 with changes in diet  She is very fatigued and will get blurry vision when she is fatigued  She has anxiety which is quite bad and needs Unisom to fall asleep  She denies diarrhea or constipation, or depression  She has hair loss and flow growing of hair primarily on the sides  She has no brittle nails or dry skin  She denies diplopia  She denies compressive thyroid symptoms or difficulties with swallowing  She is vitamin-D deficiency and takes vitamin-D 56726 units daily  Review of Systems   Constitutional: Positive for fatigue  Negative for unexpected weight change  Weight loss 26 lbs since jan 2022  HENT: Negative for trouble swallowing  Eyes: Positive for visual disturbance  Blurry vision when very fatigued  No diplopia  Respiratory: Positive for cough  Negative for chest tightness and shortness of breath  Coughing a lot with sinus infection  Cardiovascular: Negative for chest pain and palpitations  Gastrointestinal: Positive for abdominal distention and abdominal pain  Negative for constipation, diarrhea and nausea  Very bloated and gassy at times  Has abdominal pain in this case  Endocrine: Positive for heat intolerance  Negative for cold intolerance  Genitourinary:        Menses regular on a  Monthly basis on OCp      Skin: Negative for rash  Having both hair loss and slow growing of hair  Using collagen and biotin  The hair loss was primarily on the sides of the head above the ears  No brittle nails  No dry skin  Neurological: Positive for headaches  Negative for dizziness, tremors and light-headedness  Headaches, not as severe  Psychiatric/Behavioral: Positive for sleep disturbance  Negative for dysphoric mood  The patient is nervous/anxious  Needs Unisom to fall asleep  Anxiety is bad         Historical Information   Past Medical History:   Diagnosis Date   • Asthma    • Depression    • GERD (gastroesophageal reflux disease)    • Tendonitis     right wrist and thumb and bilateral heels     Past Surgical History:   Procedure Laterality Date   •  SECTION  2021   • WISDOM TOOTH EXTRACTION       Social History   Social History     Substance and Sexual Activity   Alcohol Use Yes     Social History     Substance and Sexual Activity   Drug Use Never     Social History     Tobacco Use   Smoking Status Never Smoker   Smokeless Tobacco Never Used     Family History:   Family History   Problem Relation Age of Onset   • Hypothyroidism Mother    • Hyperlipidemia Mother         high triglycerides   • Carpal tunnel syndrome Mother    • Hypertension Father    • Arthritis Father    • Hypothyroidism Paternal Grandmother    • Diabetes unspecified Paternal Grandfather    • Skin cancer Paternal Grandfather    • Hypothyroidism Maternal Aunt         multiple   • Hypothyroidism Family         multiple great aunts   • No Known Problems Brother    • No Known Problems Son    • No Known Problems Brother    • No Known Problems Son        Meds/Allergies   Current Outpatient Medications   Medication Sig Dispense Refill   • albuterol (PROVENTIL HFA,VENTOLIN HFA) 90 mcg/act inhaler Inhale 2 puffs every 6 (six) hours as needed for wheezing     • Biotin 1 MG CAPS Take by mouth     • cetirizine (ZyrTEC) 10 mg tablet Take 10 mg by mouth daily     • COLLAGEN PO Take by mouth in the morning     • ergocalciferol (VITAMIN D2) 50,000 units Take 1 capsule (50,000 Units total) by mouth once a week 12 capsule 3   • levothyroxine (Synthroid) 112 mcg tablet Take 1 tablet (112 mcg total) by mouth daily 30 tablet 6   • liothyronine (CYTOMEL) 5 mcg tablet Take 1 tablet (5 mcg total) by mouth daily 30 tablet 6   • norgestimate-ethinyl estradiol (ORTHO TRI-CYCLEN LO) 0 18/0 215/0 25 MG-25 MCG per tablet Take 1 tablet by mouth daily     • Phenylephrine HCl (AFRIN ALLERGY NA) into each nostril       No current facility-administered medications for this visit  Allergies   Allergen Reactions   • Amoxicillin      Yeast infection       Objective   Vitals: Blood pressure 112/78, pulse (!) 116, height 5' 7 75" (1 721 m), weight 105 kg (231 lb), SpO2 97 %  Invasive Devices  Report    None                 Physical Exam  Vitals reviewed  Constitutional:       Appearance: Normal appearance  She is well-developed  She is obese  HENT:      Head: Normocephalic and atraumatic  Eyes:      Extraocular Movements: Extraocular movements intact  Conjunctiva/sclera: Conjunctivae normal       Comments: No lid lag, stare, proptosis, or periorbital edema   Neck:      Thyroid: No thyromegaly  Vascular: No carotid bruit  Comments: Thyroid normal in size  Cardiovascular:      Rate and Rhythm: Normal rate and regular rhythm  Heart sounds: Normal heart sounds  No murmur heard  Pulmonary:      Effort: Pulmonary effort is normal       Breath sounds: Normal breath sounds  No wheezing  Abdominal:      Palpations: Abdomen is soft  Musculoskeletal:         General: No deformity  Normal range of motion  Cervical back: Normal range of motion and neck supple  Right lower leg: No edema  Left lower leg: No edema  Comments: No tremor of the outstretched hands   Lymphadenopathy:      Cervical: No cervical adenopathy     Skin:     General: Skin is warm and dry  Findings: No rash  Neurological:      Mental Status: She is alert and oriented to person, place, and time  Deep Tendon Reflexes: Reflexes are normal and symmetric  Comments: Patellar deep tendon reflexes normal         The history was obtained from the review of the chart and from the patient  Lab Results:      Blood work done on 10/21/2022 showed a CMP with a calcium of 9 2 in the setting of an albumin of 3 9 and a phosphorus of 2 6  Lab Results   Component Value Date    CREATININE 0 97 10/21/2022    CREATININE 1 01 01/13/2022    CREATININE 0 82 06/29/2021    BUN 18 10/21/2022    K 4 2 10/21/2022     10/21/2022    CO2 24 10/21/2022     eGFR   Date Value Ref Range Status   10/21/2022 78 > OR = 60 mL/min/1 73m2 Final     Comment:     The eGFR is based on the CKD-EPI 2021 equation  To calculate   the new eGFR from a previous Creatinine or Cystatin C  result, go to [employer] at  [email or web address]  [email or web address]           Lab Results   Component Value Date    ALT 15 10/21/2022    AST 10 10/21/2022    ALKPHOS 65 10/21/2022       Lab Results   Component Value Date    TSH 1 88 10/21/2022    FREET4 1 4 10/21/2022     Free T3 is 4 2          Future Appointments   Date Time Provider Deborah Melendez   4/28/2023 11:40 AM Donnell Velasquez MD [facility]

## 2023-05-23 DIAGNOSIS — E03.9 ACQUIRED HYPOTHYROIDISM: ICD-10-CM

## 2023-05-23 RX ORDER — LIOTHYRONINE SODIUM 5 UG/1
5 TABLET ORAL DAILY
Qty: 30 TABLET | Refills: 0 | Status: SHIPPED | OUTPATIENT
Start: 2023-05-23

## 2023-05-23 RX ORDER — LEVOTHYROXINE SODIUM 112 UG/1
112 TABLET ORAL DAILY
Qty: 30 TABLET | Refills: 0 | Status: SHIPPED | OUTPATIENT
Start: 2023-05-23

## 2023-06-05 ENCOUNTER — OFFICE VISIT (OUTPATIENT)
Dept: ENDOCRINOLOGY | Facility: HOSPITAL | Age: 38
End: 2023-06-05
Payer: COMMERCIAL

## 2023-06-05 VITALS
HEIGHT: 68 IN | SYSTOLIC BLOOD PRESSURE: 110 MMHG | BODY MASS INDEX: 36.83 KG/M2 | WEIGHT: 243 LBS | HEART RATE: 92 BPM | DIASTOLIC BLOOD PRESSURE: 78 MMHG

## 2023-06-05 DIAGNOSIS — E03.9 ACQUIRED HYPOTHYROIDISM: Primary | ICD-10-CM

## 2023-06-05 DIAGNOSIS — E55.9 VITAMIN D DEFICIENCY: ICD-10-CM

## 2023-06-05 PROCEDURE — 99213 OFFICE O/P EST LOW 20 MIN: CPT | Performed by: INTERNAL MEDICINE

## 2023-06-05 RX ORDER — LIOTHYRONINE SODIUM 5 UG/1
5 TABLET ORAL DAILY
Qty: 30 TABLET | Refills: 6 | Status: SHIPPED | OUTPATIENT
Start: 2023-06-05

## 2023-06-05 RX ORDER — LEVOTHYROXINE SODIUM 0.12 MG/1
125 TABLET ORAL DAILY
Qty: 30 TABLET | Refills: 6 | Status: SHIPPED | OUTPATIENT
Start: 2023-06-05

## 2023-06-05 NOTE — PROGRESS NOTES
6/5/2023    Assessment/Plan      Diagnoses and all orders for this visit:    Acquired hypothyroidism  -     levothyroxine 125 mcg tablet; Take 1 tablet (125 mcg total) by mouth daily  -     liothyronine (CYTOMEL) 5 mcg tablet; Take 1 tablet (5 mcg total) by mouth daily  -     TSH, 3rd generation Lab Collect; Future  -     T4, free Lab Collect; Future  -     T4, free Lab Collect; Future  -     TSH, 3rd generation Lab Collect; Future  -     T3, free; Future  -     T3, free; Future    Vitamin D deficiency        Assessment/Plan:  1  Hypothyroidism  Most recent thyroid function studies are normal but her TSH is in the high normal range and essentially hypothyroid  I would like to see her TSH between 1 and 2 as that is where most people feel the best   I will have her increase her levothyroxine to 125 mcg daily and continue the same liothyronine 5 mcg daily  I will then repeat her blood work in 3 months and adjust her medication accordingly if needed  2   Vitamin D deficiency  Vitamin D level is normal   She will continue the same vitamin D 50,000 units once a week  I have asked her to get a TSH with free T4 and free T3 in 3 months  I have asked her to follow-up in 6 months with preceding TSH, free T4, and free T3       CC: Hypothyroid vitamin D deficiency follow-up    History of Present Illness     HPI: Nuris Benton is a 41y o  year old female with history of hypothyroidism and vitamin D deficiency for follow-up visit  She was diagnosed with hypothyroidism around 2006 to 2007 and started thyroid hormone replacement  She tried T3 supplementation in April 2020 but continue with hypothyroid symptoms  Eventually she did feel better with the combination of T3 and T4 but became pregnant and had to switch to just strictly T4 supplementation      She switched back to combination therapy in October 2021 when she was 5 months postpartum    She is currently taking levothyroxine 112 mcg daily and liothyronine 5 mcg daily  #Weight is 12 pounds more than last year  She has lost 10 to 15 pounds recently doing 18 hours of fasting and 6 hours of eating  She denies heat or cold intolerance, palpitation, or tremors  She has noticed an increase in fatigue  She feels more depression every June  She admits to sleeping issues as her mind races when she is awake  She denies diarrhea or constipation or anxiety  She denies dry skin or brittle nails and hair loss is less  Menstrual cycles do occur on a regular monthly basis on her oral contraceptives and last 3 to 4 days  She denies diplopia  She denies compressive thyroid symptoms or difficulties with swallowing  She has vitamin D deficiency and takes vitamin D 50,000 units weekly  Review of Systems   Constitutional: Positive for fatigue  Negative for unexpected weight change  Weight 12 lbs more than last year  Doing 18/6 hour fasting  Lost 10-15 lbs already  HENT: Negative for trouble swallowing  Eyes: Negative for visual disturbance  No diplopia  Respiratory: Negative for chest tightness and shortness of breath  Cardiovascular: Negative for chest pain and palpitations  Gastrointestinal: Negative for abdominal pain, constipation, diarrhea and nausea  More acid reflux recently which tends to signal need for thyroid medicine change  Endocrine: Negative for cold intolerance and heat intolerance  Genitourinary:        Menses regular on a monthly basis on OCP lasting 3-4 days  Musculoskeletal:        Right tennis elbow, got cortisone injection in Jan 2023  Skin: Negative for rash  No dry skin  No brittle nails  Less hair loss  Neurological: Negative for dizziness, tremors, light-headedness and headaches  Psychiatric/Behavioral: Positive for dysphoric mood and sleep disturbance  The patient is not nervous/anxious  Stress with son getting bullied  He is in    Baby sleeps with her so she is up until 11-4 am and up at 7;30 am, trouble falling asleep and staying asleep  Mind races at night  Depression in  yearly          Historical Information   Past Medical History:   Diagnosis Date   • Asthma    • Depression    • GERD (gastroesophageal reflux disease)    • Tendonitis     right wrist and thumb and bilateral heels     Past Surgical History:   Procedure Laterality Date   •  SECTION  2021   • WISDOM TOOTH EXTRACTION       Social History   Social History     Substance and Sexual Activity   Alcohol Use Yes     Social History     Substance and Sexual Activity   Drug Use Never     Social History     Tobacco Use   Smoking Status Never   Smokeless Tobacco Never     Family History:   Family History   Problem Relation Age of Onset   • Hypothyroidism Mother    • Hyperlipidemia Mother         high triglycerides   • Carpal tunnel syndrome Mother    • Hypertension Father    • Arthritis Father    • Hypothyroidism Paternal Grandmother    • Diabetes unspecified Paternal Grandfather    • Skin cancer Paternal Grandfather    • Hypothyroidism Maternal Aunt         multiple   • Hypothyroidism Family         multiple great aunts   • No Known Problems Brother    • No Known Problems Son    • No Known Problems Brother    • No Known Problems Son        Meds/Allergies   Current Outpatient Medications   Medication Sig Dispense Refill   • albuterol (PROVENTIL HFA,VENTOLIN HFA) 90 mcg/act inhaler Inhale 2 puffs every 6 (six) hours as needed for wheezing     • Biotin 1 MG CAPS Take by mouth     • cetirizine (ZyrTEC) 10 mg tablet Take 10 mg by mouth daily     • COLLAGEN PO Take by mouth in the morning     • ergocalciferol (VITAMIN D2) 50,000 units Take 1 capsule (50,000 Units total) by mouth once a week 12 capsule 3   • levothyroxine 125 mcg tablet Take 1 tablet (125 mcg total) by mouth daily 30 tablet 6   • liothyronine (CYTOMEL) 5 mcg tablet Take 1 tablet (5 mcg total) by mouth daily 30 tablet 6   • norgestimate-ethinyl "estradiol (ORTHO TRI-CYCLEN LO) 0 18/0 215/0 25 MG-25 MCG per tablet Take 1 tablet by mouth daily     • Phenylephrine HCl (AFRIN ALLERGY NA) into each nostril       No current facility-administered medications for this visit  Allergies   Allergen Reactions   • Amoxicillin      Yeast infection       Objective   Vitals: Blood pressure 110/78, pulse 92, height 5' 7 75\" (1 721 m), weight 110 kg (243 lb)  Invasive Devices     None                 Physical Exam  Vitals reviewed  Constitutional:       Appearance: Normal appearance  She is well-developed  HENT:      Head: Normocephalic and atraumatic  Eyes:      Extraocular Movements: Extraocular movements intact  Conjunctiva/sclera: Conjunctivae normal       Comments: No lid lag, stare, proptosis, or periorbital edema  Neck:      Thyroid: No thyromegaly  Vascular: No carotid bruit  Comments: Thyroid normal in size  No palpable thyroid nodules  Cardiovascular:      Rate and Rhythm: Normal rate and regular rhythm  Heart sounds: Normal heart sounds  No murmur heard  Pulmonary:      Effort: Pulmonary effort is normal       Breath sounds: Normal breath sounds  No wheezing  Abdominal:      Palpations: Abdomen is soft  Musculoskeletal:         General: No deformity  Normal range of motion  Cervical back: Normal range of motion and neck supple  Right lower leg: No edema  Left lower leg: No edema  Comments: No tremor of the outstretched hands  Lymphadenopathy:      Cervical: No cervical adenopathy  Skin:     General: Skin is warm and dry  Findings: No rash  Neurological:      Mental Status: She is alert and oriented to person, place, and time  Deep Tendon Reflexes: Reflexes are normal and symmetric  Comments: Patellar deep tendon reflexes normal          The history was obtained from the review of the chart and from the patient      Lab Results:      Blood work performed at [facility] on 6/1/2023 " showed a TSH of 4 18 with a free T4 of 1 1 and a free T3 of 2 8  CMP showed a glucose of 109 random but was otherwise normal     25-hydroxy vitamin D level is 46  Lab Results   Component Value Date    BUN 18 10/21/2022     10/21/2022    CO2 24 10/21/2022    CREATININE 0 97 10/21/2022    CREATININE 1 01 01/13/2022    CREATININE 0 82 06/29/2021    K 4 2 10/21/2022     eGFR   Date Value Ref Range Status   10/21/2022 78 > OR = 60 mL/min/1 73m2 Final     Comment:     The eGFR is based on the CKD-EPI 2021 equation  To calculate   the new eGFR from a previous Creatinine or Cystatin C  result, go to [employer] at  [email or web address]/[email or web address]  [email or web address]           Lab Results   Component Value Date    ALKPHOS 65 10/21/2022    ALT 15 10/21/2022    AST 10 10/21/2022       Lab Results   Component Value Date    FREET4 1 4 10/21/2022    TSH 1 88 10/21/2022             Future Appointments   Date Time Provider Deborah Melendez   12/11/2023  9:40 AM Jaky Yeager MD [facility]

## 2023-06-05 NOTE — PATIENT INSTRUCTIONS
The thyroid is not good enough  Increase the levothyroxine to 125 mcg daily  Continue he same liothyronine 5 mcg daily  We'll recheck blood work in 3 months  Follow up in 6 months with blood work

## 2023-12-20 DIAGNOSIS — E03.9 ACQUIRED HYPOTHYROIDISM: ICD-10-CM

## 2023-12-20 RX ORDER — LEVOTHYROXINE SODIUM 0.12 MG/1
125 TABLET ORAL DAILY
Qty: 30 TABLET | Refills: 6 | Status: SHIPPED | OUTPATIENT
Start: 2023-12-20

## 2024-01-02 DIAGNOSIS — E03.9 ACQUIRED HYPOTHYROIDISM: ICD-10-CM

## 2024-01-02 RX ORDER — LIOTHYRONINE SODIUM 5 UG/1
5 TABLET ORAL DAILY
Qty: 30 TABLET | Refills: 0 | Status: SHIPPED | OUTPATIENT
Start: 2024-01-02

## 2024-01-02 RX ORDER — LEVOTHYROXINE SODIUM 0.12 MG/1
125 TABLET ORAL DAILY
Qty: 30 TABLET | Refills: 0 | Status: SHIPPED | OUTPATIENT
Start: 2024-01-02

## 2024-06-05 DIAGNOSIS — E03.9 ACQUIRED HYPOTHYROIDISM: ICD-10-CM

## 2024-06-05 DIAGNOSIS — E03.9 ACQUIRED HYPOTHYROIDISM: Primary | ICD-10-CM

## 2024-06-05 LAB
T4 FREE SERPL-MCNC: 1.2 NG/DL (ref 0.8–1.8)
TSH SERPL-ACNC: 5.37 MIU/L

## 2024-06-05 RX ORDER — LEVOTHYROXINE SODIUM 0.12 MG/1
125 TABLET ORAL DAILY
Qty: 30 TABLET | Refills: 0 | Status: SHIPPED | OUTPATIENT
Start: 2024-06-05

## 2024-06-05 NOTE — TELEPHONE ENCOUNTER
Pt need refill sent to [facility]      Levothyroxine 125 mcg    Has appt on 6/10/24

## 2024-06-10 ENCOUNTER — OFFICE VISIT (OUTPATIENT)
Dept: ENDOCRINOLOGY | Facility: HOSPITAL | Age: 39
End: 2024-06-10
Payer: COMMERCIAL

## 2024-06-10 VITALS
BODY MASS INDEX: 38.43 KG/M2 | DIASTOLIC BLOOD PRESSURE: 90 MMHG | HEIGHT: 68 IN | HEART RATE: 100 BPM | SYSTOLIC BLOOD PRESSURE: 134 MMHG | WEIGHT: 253.6 LBS

## 2024-06-10 DIAGNOSIS — E03.9 ACQUIRED HYPOTHYROIDISM: Primary | ICD-10-CM

## 2024-06-10 PROCEDURE — 99213 OFFICE O/P EST LOW 20 MIN: CPT | Performed by: PHYSICIAN ASSISTANT

## 2024-06-10 RX ORDER — LEVOTHYROXINE SODIUM 0.12 MG/1
125 TABLET ORAL DAILY
Qty: 90 TABLET | Refills: 3 | Status: SHIPPED | OUTPATIENT
Start: 2024-06-10

## 2024-06-10 RX ORDER — LIOTHYRONINE SODIUM 5 UG/1
5 TABLET ORAL DAILY
Qty: 90 TABLET | Refills: 3 | Status: SHIPPED | OUTPATIENT
Start: 2024-06-10

## 2024-06-10 NOTE — PATIENT INSTRUCTIONS
Continue with dame ashanti of thyroid medication.    Get lab work in about 6 weeks.    Call with any concerns or questions.    Follow up in 1 year with lab work prior to visit.

## 2024-06-10 NOTE — PROGRESS NOTES
Mitul Vernon 39 y.o. female MRN: [id number]    Encounter: [id number]      Assessment & Plan     Assessment:  This is a 39 y.o.-year-old female with hypothyroidism.    Plan:  Hypothyroidism: Recent thyroid lab work revealed slightly elevated TSH but a normal free T4.  Unfortunately T3 was not obtained.  She has also been without her liothyronine for several months.  At this time will not make any adjustments to her thyroid medication.  Sent refills over to the pharmacy.  Asked her to repeat thyroid lab work in about 6 weeks.  Contact the office with any concerns or questions.  Follow-up in 1 year with lab work completed prior to visit.    CC: Hypothyroidism follow-up    History of Present Illness     HPI:  Mitul Vernon is a 39 year old female with hypothyroidism and vitamin D deficiency for follow-up visit.     She was diagnosed with hypothyroidism around 2006 to 2007 and started thyroid hormone replacement.  She tried T3 supplementation in April 2020 but continue with hypothyroid symptoms.  Eventually she did feel better with the combination of T3 and T4 but became pregnant and had to switch to just strictly T4 supplementation.     She switched back to combination therapy in October 2021 when she was 5 months postpartum.  She is currently taking levothyroxine 125 mcg daily and liothyronine 5 mcg daily.  Unfortunately she was supposed to have lab work completed about 6 months ago, and never had it done.  Her liothyronine was also not refilled for the past few months.  Has been taking her levothyroxine 125 and as directed though.  Does have fatigue.  Weight is 10 pounds more than last year.  She denies heat or cold intolerance, palpitation, or tremors.  She admits to sleeping issues as her mind races when she is awake.  She denies diarrhea or constipation or anxiety.  She denies dry skin or brittle nails and hair loss is less.  Menstrual cycles do occur on a regular monthly basis on her oral contraceptives and  last 3 to 4 days.  She denies diplopia.  She denies compressive thyroid symptoms or difficulties with swallowing.  Overall doing well today without any concerns.     She has vitamin D deficiency and takes vitamin D 50,000 units weekly.    Review of Systems   Constitutional:  Positive for fatigue and unexpected weight change. Negative for activity change, appetite change and diaphoresis.   HENT:  Negative for sore throat, trouble swallowing and voice change.    Eyes:  Negative for visual disturbance.   Respiratory:  Negative for chest tightness and shortness of breath.    Cardiovascular:  Negative for chest pain, palpitations and leg swelling.   Gastrointestinal:  Negative for abdominal pain, constipation and diarrhea.   Endocrine: Negative for cold intolerance, heat intolerance, polydipsia, polyphagia and polyuria.   Skin:  Negative for rash.   Neurological:  Negative for dizziness, tremors, light-headedness, numbness and headaches.   Hematological:  Negative for adenopathy.   Psychiatric/Behavioral:  Positive for sleep disturbance. Negative for dysphoric mood. The patient is not nervous/anxious.        Historical Information   Past Medical History:   Diagnosis Date   • Asthma    • Depression    • GERD (gastroesophageal reflux disease)    • Tendonitis     right wrist and thumb and bilateral heels     Past Surgical History:   Procedure Laterality Date   •  SECTION  2021   • WISDOM TOOTH EXTRACTION       Social History   Social History     Substance and Sexual Activity   Alcohol Use Yes     Social History     Substance and Sexual Activity   Drug Use Never     Social History     Tobacco Use   Smoking Status Never   Smokeless Tobacco Never     Family History:   Family History   Problem Relation Age of Onset   • Hypothyroidism Mother    • Hyperlipidemia Mother         high triglycerides   • Carpal tunnel syndrome Mother    • Hypertension Father    • Arthritis Father    • Hypothyroidism Paternal Grandmother   "  • Diabetes unspecified Paternal Grandfather    • Skin cancer Paternal Grandfather    • Hypothyroidism Maternal Aunt         multiple   • Hypothyroidism Family         multiple great aunts   • No Known Problems Brother    • No Known Problems Son    • No Known Problems Brother    • No Known Problems Son        Meds/Allergies   Current Outpatient Medications   Medication Sig Dispense Refill   • albuterol (PROVENTIL HFA,VENTOLIN HFA) 90 mcg/act inhaler Inhale 2 puffs every 6 (six) hours as needed for wheezing     • Biotin 1 MG CAPS Take by mouth     • cetirizine (ZyrTEC) 10 mg tablet Take 10 mg by mouth daily     • COLLAGEN PO Take by mouth in the morning     • ergocalciferol (VITAMIN D2) 50,000 units Take 1 capsule (50,000 Units total) by mouth once a week 12 capsule 3   • levothyroxine 125 mcg tablet Take 1 tablet (125 mcg total) by mouth daily 90 tablet 3   • liothyronine (CYTOMEL) 5 mcg tablet Take 1 tablet (5 mcg total) by mouth daily 90 tablet 3   • norgestimate-ethinyl estradiol (ORTHO TRI-CYCLEN LO) 0.18/0.215/0.25 MG-25 MCG per tablet Take 1 tablet by mouth daily     • Phenylephrine HCl (AFRIN ALLERGY NA) into each nostril       No current facility-administered medications for this visit.     Allergies   Allergen Reactions   • Amoxicillin      Yeast infection       Objective   Vitals: Blood pressure 134/90, pulse 100, height 5' 8\" (1.727 m), weight 115 kg (253 lb 9.6 oz).    Physical Exam  Vitals and nursing note reviewed.   Constitutional:       General: She is not in acute distress.     Appearance: Normal appearance. She is not diaphoretic.   HENT:      Head: Normocephalic and atraumatic.   Eyes:      General: No scleral icterus.     Extraocular Movements: Extraocular movements intact.      Conjunctiva/sclera: Conjunctivae normal.      Pupils: Pupils are equal, round, and reactive to light.   Neck:      Thyroid: No thyroid mass, thyromegaly or thyroid tenderness.   Cardiovascular:      Rate and Rhythm: " "Normal rate and regular rhythm.      Heart sounds: No murmur heard.  Pulmonary:      Effort: Pulmonary effort is normal. No respiratory distress.      Breath sounds: Normal breath sounds. No wheezing.   Musculoskeletal:      Cervical back: Normal range of motion.      Right lower leg: No edema.      Left lower leg: No edema.   Lymphadenopathy:      Cervical: No cervical adenopathy.   Neurological:      Mental Status: She is alert and oriented to person, place, and time. Mental status is at baseline.      Sensory: No sensory deficit.      Motor: No tremor.      Gait: Gait normal.      Deep Tendon Reflexes:      Reflex Scores:       Patellar reflexes are 2+ on the right side and 2+ on the left side.  Psychiatric:         Mood and Affect: Mood normal.         Behavior: Behavior normal.         Thought Content: Thought content normal.         The history was obtained from the review of the chart, patient.    Lab Results:   Lab Results   Component Value Date/Time    Free t4 1.2 06/05/2024 09:17 AM         Portions of the record may have been created with voice recognition software. Occasional wrong word or \"sound a like\" substitutions may have occurred due to the inherent limitations of voice recognition software. Read the chart carefully and recognize, using context, where substitutions have occurred.    "

## 2025-07-21 DIAGNOSIS — E03.9 ACQUIRED HYPOTHYROIDISM: ICD-10-CM

## 2025-08-07 RX ORDER — LIOTHYRONINE SODIUM 5 UG/1
5 TABLET ORAL DAILY
Qty: 90 TABLET | Refills: 3 | OUTPATIENT
Start: 2025-08-07

## 2025-08-07 RX ORDER — LEVOTHYROXINE SODIUM 125 UG/1
125 TABLET ORAL DAILY
Qty: 90 TABLET | Refills: 3 | OUTPATIENT
Start: 2025-08-07